# Patient Record
Sex: FEMALE | Race: WHITE | NOT HISPANIC OR LATINO | URBAN - METROPOLITAN AREA
[De-identification: names, ages, dates, MRNs, and addresses within clinical notes are randomized per-mention and may not be internally consistent; named-entity substitution may affect disease eponyms.]

---

## 2019-02-01 NOTE — PATIENT DISCUSSION
New Prescription: ciprofloxacin (ciprofloxacin): drops: 0.3% 1 drop four times a day as directed into left eye 02-

## 2019-02-01 NOTE — PATIENT DISCUSSION
- Small, central dendrite measuring 4 mm x 0.5 mm with slight stromal edema causing blurry vision and irritation

## 2019-02-01 NOTE — PATIENT DISCUSSION
New Prescription: Valtrex (valacyclovir): tablet: 1 g 1 tablet three times a day as directed by mouth 02-

## 2019-02-12 NOTE — PATIENT DISCUSSION
Stopped Today: ciprofloxacin (ciprofloxacin): drops: 0.3% 1 drop four times a day as directed into left eye 02-

## 2022-10-28 ENCOUNTER — COMPREHENSIVE EXAM (OUTPATIENT)
Dept: URBAN - METROPOLITAN AREA CLINIC 16 | Facility: CLINIC | Age: 52
End: 2022-10-28

## 2022-10-28 DIAGNOSIS — Z01.00: ICD-10-CM

## 2022-10-28 DIAGNOSIS — H52.4: ICD-10-CM

## 2022-10-28 DIAGNOSIS — H52.03: ICD-10-CM

## 2022-10-28 PROCEDURE — 92310C CONTACT LENS 75

## 2022-10-28 PROCEDURE — 92015 DETERMINE REFRACTIVE STATE: CPT

## 2022-10-28 PROCEDURE — 92014 COMPRE OPH EXAM EST PT 1/>: CPT

## 2022-10-28 ASSESSMENT — KERATOMETRY
OD_AXISANGLE2_DEGREES: 80
OS_AXISANGLE_DEGREES: 175
OD_AXISANGLE_DEGREES: 170
OD_K1POWER_DIOPTERS: 42.50
OD_K2POWER_DIOPTERS: 43.50
OS_K2POWER_DIOPTERS: 43.25
OS_K1POWER_DIOPTERS: 42.00
OS_AXISANGLE2_DEGREES: 85

## 2022-10-28 ASSESSMENT — VISUAL ACUITY
OD_SC: 20/50-1
OS_SC: 20/50-2

## 2022-10-28 ASSESSMENT — TONOMETRY
OS_IOP_MMHG: 14
OD_IOP_MMHG: 13

## 2023-11-03 ENCOUNTER — ESTABLISHED PATIENT (OUTPATIENT)
Facility: LOCATION | Age: 53
End: 2023-11-03

## 2023-11-03 DIAGNOSIS — H04.123: ICD-10-CM

## 2023-11-03 DIAGNOSIS — H10.45: ICD-10-CM

## 2023-11-03 PROCEDURE — 92014 COMPRE OPH EXAM EST PT 1/>: CPT

## 2023-11-03 PROCEDURE — 92015 DETERMINE REFRACTIVE STATE: CPT

## 2023-11-03 ASSESSMENT — KERATOMETRY
OD_AXISANGLE2_DEGREES: 88
OD_AXISANGLE_DEGREES: 178
OS_K1POWER_DIOPTERS: 42.25
OD_K2POWER_DIOPTERS: 43.50
OS_AXISANGLE2_DEGREES: 92
OS_AXISANGLE_DEGREES: 2
OS_K2POWER_DIOPTERS: 43.75
OD_K1POWER_DIOPTERS: 42.50

## 2023-11-03 ASSESSMENT — VISUAL ACUITY
OD_SC: 20/50-1
OS_CC: 20/30-1
OU_CC: 20/20-2
OU_SC: 20/40
OD_CC: 20/25
OS_SC: 20/50+1

## 2023-11-03 ASSESSMENT — TONOMETRY
OD_IOP_MMHG: 14
OS_IOP_MMHG: 16

## 2023-11-27 ENCOUNTER — ESTABLISHED PATIENT (OUTPATIENT)
Facility: LOCATION | Age: 53
End: 2023-11-27

## 2023-11-27 DIAGNOSIS — H52.03: ICD-10-CM

## 2023-11-27 PROCEDURE — 92310E CONTACT LENS 100

## 2023-11-27 ASSESSMENT — KERATOMETRY
OD_K2POWER_DIOPTERS: 43.50
OS_AXISANGLE_DEGREES: 2
OS_AXISANGLE2_DEGREES: 92
OD_AXISANGLE_DEGREES: 178
OD_AXISANGLE2_DEGREES: 88
OD_K1POWER_DIOPTERS: 42.50
OS_K1POWER_DIOPTERS: 42.25
OS_K2POWER_DIOPTERS: 43.75

## 2023-11-27 ASSESSMENT — VISUAL ACUITY
OU_CC: 20/25-2
OU_CC: J2

## 2023-12-11 ENCOUNTER — CONTACT LENSES/GLASSES VISIT (OUTPATIENT)
Facility: LOCATION | Age: 53
End: 2023-12-11

## 2023-12-11 DIAGNOSIS — H52.03: ICD-10-CM

## 2023-12-11 PROCEDURE — 92310 CONTACT LENS FITTING OU: CPT

## 2024-06-26 ENCOUNTER — CONSULTATION/EVALUATION (OUTPATIENT)
Dept: URBAN - METROPOLITAN AREA CLINIC 14 | Facility: CLINIC | Age: 54
End: 2024-06-26

## 2024-06-26 DIAGNOSIS — H52.03: ICD-10-CM

## 2024-06-26 PROCEDURE — 99499LKFN LASIK CONSULT NON CANDIDATE: Mod: NC

## 2024-06-26 ASSESSMENT — PACHYMETRY
OS_CT_UM: 556
OD_CT_UM: 553

## 2024-06-26 ASSESSMENT — VISUAL ACUITY
OS_SC: 20/70
OD_BCVA: 20/25
OD_SC: 20/70
OD_CC: 20/25
OS_CC: 20/25
OS_BCVA: 20/25

## 2024-06-26 ASSESSMENT — KERATOMETRY
OD_AXISANGLE_DEGREES: 180
OS_K2POWER_DIOPTERS: 43.25
OS_K1POWER_DIOPTERS: 42.5
OD_AXISANGLE2_DEGREES: 90
OS_AXISANGLE_DEGREES: 2
OS_AXISANGLE2_DEGREES: 92
OD_K1POWER_DIOPTERS: 42.75
OD_K2POWER_DIOPTERS: 43.75

## 2024-07-09 ENCOUNTER — CONSULTATION/EVALUATION (OUTPATIENT)
Facility: LOCATION | Age: 54
End: 2024-07-09

## 2024-07-09 DIAGNOSIS — H04.123: ICD-10-CM

## 2024-07-09 DIAGNOSIS — H16.143: ICD-10-CM

## 2024-07-09 PROCEDURE — 92285 EXTERNAL OCULAR PHOTOGRAPHY: CPT

## 2024-07-09 PROCEDURE — 68761 CLOSE TEAR DUCT OPENING: CPT | Mod: 51,E4,E2

## 2024-07-09 ASSESSMENT — TONOMETRY
OD_IOP_MMHG: 12
OS_IOP_MMHG: 13

## 2024-07-09 ASSESSMENT — VISUAL ACUITY
OD_CC: 20/25
OU_CC: 20/20
OS_CC: 20/30+1

## 2024-09-03 ENCOUNTER — FOLLOW UP (OUTPATIENT)
Facility: LOCATION | Age: 54
End: 2024-09-03

## 2024-09-03 DIAGNOSIS — H04.123: ICD-10-CM

## 2024-09-03 DIAGNOSIS — H16.143: ICD-10-CM

## 2024-09-03 PROCEDURE — 99214 OFFICE O/P EST MOD 30 MIN: CPT

## 2024-09-24 ENCOUNTER — FOLLOW UP (OUTPATIENT)
Facility: LOCATION | Age: 54
End: 2024-09-24

## 2024-09-24 DIAGNOSIS — H04.123: ICD-10-CM

## 2024-09-24 PROCEDURE — 99214 OFFICE O/P EST MOD 30 MIN: CPT

## 2024-10-29 ENCOUNTER — FOLLOW UP (OUTPATIENT)
Facility: LOCATION | Age: 54
End: 2024-10-29

## 2024-10-29 DIAGNOSIS — H16.143: ICD-10-CM

## 2024-10-29 DIAGNOSIS — H04.123: ICD-10-CM

## 2024-10-29 PROCEDURE — 99214 OFFICE O/P EST MOD 30 MIN: CPT

## 2024-11-19 ENCOUNTER — FOLLOW UP (OUTPATIENT)
Facility: LOCATION | Age: 54
End: 2024-11-19

## 2024-11-19 DIAGNOSIS — H04.123: ICD-10-CM

## 2024-11-19 DIAGNOSIS — H16.143: ICD-10-CM

## 2024-11-19 PROCEDURE — 99214 OFFICE O/P EST MOD 30 MIN: CPT
